# Patient Record
Sex: FEMALE | Race: WHITE | NOT HISPANIC OR LATINO | URBAN - METROPOLITAN AREA
[De-identification: names, ages, dates, MRNs, and addresses within clinical notes are randomized per-mention and may not be internally consistent; named-entity substitution may affect disease eponyms.]

---

## 2017-04-10 ENCOUNTER — APPOINTMENT (RX ONLY)
Dept: URBAN - METROPOLITAN AREA CLINIC 310 | Facility: CLINIC | Age: 67
Setting detail: DERMATOLOGY
End: 2017-04-10

## 2017-04-10 DIAGNOSIS — L90.0 LICHEN SCLEROSUS ET ATROPHICUS: ICD-10-CM

## 2017-04-10 DIAGNOSIS — B37.2 CANDIDIASIS OF SKIN AND NAIL: ICD-10-CM

## 2017-04-10 PROCEDURE — 99213 OFFICE O/P EST LOW 20 MIN: CPT

## 2017-04-10 PROCEDURE — ? COUNSELING

## 2017-04-10 PROCEDURE — ? OTHER

## 2017-04-10 NOTE — PROCEDURE: OTHER
Other (Free Text): Pt can use home supply of clotrimazole; pt has GYN appt tomorrow to test for vaginal candidiasis versus bacterial vaginosis; instructed pt she can d/c clotrimazole if GYN places her on diflucan
Detail Level: Detailed
Other (Free Text): Patient has home supply of clobetasol
Note Text (......Xxx Chief Complaint.): This diagnosis correlates with the

## 2017-09-20 ENCOUNTER — APPOINTMENT (RX ONLY)
Dept: URBAN - METROPOLITAN AREA CLINIC 310 | Facility: CLINIC | Age: 67
Setting detail: DERMATOLOGY
End: 2017-09-20

## 2017-09-20 DIAGNOSIS — D22 MELANOCYTIC NEVI: ICD-10-CM

## 2017-09-20 DIAGNOSIS — L73.9 FOLLICULAR DISORDER, UNSPECIFIED: ICD-10-CM

## 2017-09-20 DIAGNOSIS — L738 OTHER SPECIFIED DISEASES OF HAIR AND HAIR FOLLICLES: ICD-10-CM

## 2017-09-20 DIAGNOSIS — L30.4 ERYTHEMA INTERTRIGO: ICD-10-CM

## 2017-09-20 DIAGNOSIS — L663 OTHER SPECIFIED DISEASES OF HAIR AND HAIR FOLLICLES: ICD-10-CM

## 2017-09-20 PROBLEM — D22.5 MELANOCYTIC NEVI OF TRUNK: Status: ACTIVE | Noted: 2017-09-20

## 2017-09-20 PROBLEM — L02.422 FURUNCLE OF LEFT AXILLA: Status: ACTIVE | Noted: 2017-09-20

## 2017-09-20 PROBLEM — J30.1 ALLERGIC RHINITIS DUE TO POLLEN: Status: ACTIVE | Noted: 2017-09-20

## 2017-09-20 PROBLEM — L02.32 FURUNCLE OF BUTTOCK: Status: ACTIVE | Noted: 2017-09-20

## 2017-09-20 PROCEDURE — ? TREATMENT REGIMEN

## 2017-09-20 PROCEDURE — ? OTHER

## 2017-09-20 PROCEDURE — ? PRESCRIPTION

## 2017-09-20 PROCEDURE — 99213 OFFICE O/P EST LOW 20 MIN: CPT

## 2017-09-20 RX ORDER — CLOTRIMAZOLE 10 MG/G
CREAM TOPICAL
Qty: 1 | Refills: 3 | Status: ERX

## 2017-09-20 ASSESSMENT — LOCATION DETAILED DESCRIPTION DERM
LOCATION DETAILED: RIGHT ANTERIOR MEDIAL PROXIMAL THIGH
LOCATION DETAILED: LEFT ANTERIOR AXILLA
LOCATION DETAILED: LEFT ANTERIOR MEDIAL PROXIMAL THIGH
LOCATION DETAILED: LEFT MEDIAL BUTTOCK
LOCATION DETAILED: RIGHT MEDIAL UPPER BACK

## 2017-09-20 ASSESSMENT — LOCATION SIMPLE DESCRIPTION DERM
LOCATION SIMPLE: LEFT THIGH
LOCATION SIMPLE: LEFT BUTTOCK
LOCATION SIMPLE: RIGHT THIGH
LOCATION SIMPLE: RIGHT UPPER BACK
LOCATION SIMPLE: LEFT AXILLA

## 2017-09-20 ASSESSMENT — LOCATION ZONE DERM
LOCATION ZONE: TRUNK
LOCATION ZONE: LEG
LOCATION ZONE: AXILLAE

## 2017-09-20 NOTE — PROCEDURE: OTHER
Detail Level: Detailed
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 Scheduled for surgery in October
Note Text (......Xxx Chief Complaint.): This diagnosis correlates with the

## 2017-09-20 NOTE — PROCEDURE: TREATMENT REGIMEN
Notification: Please note that there are new Treatment Regimen plans which have an enhanced patient education handout experience.  The plans are called Treatment Regimen (Acne), Treatment Regimen (Atopic Dermatitis), Treatment Regimen (Rosacea), Treatment Regimen (Sun Protection), Treatment Regimen (Cosmetic Products), and Treatment Regimen (Xerosis).
Detail Level: Zone
Plan: shaving avoidance, air drying of areas. Antibiotics not helpful in past.  No culture. Make sure her surgeon doing the colon-vaginal repair of fistula knows she has had boils in buttock area

## 2017-11-02 ENCOUNTER — APPOINTMENT (RX ONLY)
Dept: URBAN - METROPOLITAN AREA CLINIC 310 | Facility: CLINIC | Age: 67
Setting detail: DERMATOLOGY
End: 2017-11-02

## 2017-11-02 DIAGNOSIS — L24 IRRITANT CONTACT DERMATITIS: ICD-10-CM

## 2017-11-02 PROBLEM — L24.9 IRRITANT CONTACT DERMATITIS, UNSPECIFIED CAUSE: Status: ACTIVE | Noted: 2017-11-02

## 2017-11-02 PROBLEM — L85.3 XEROSIS CUTIS: Status: ACTIVE | Noted: 2017-11-02

## 2017-11-02 PROCEDURE — ? PRESCRIPTION

## 2017-11-02 PROCEDURE — ? COUNSELING

## 2017-11-02 PROCEDURE — 99213 OFFICE O/P EST LOW 20 MIN: CPT

## 2017-11-02 RX ORDER — TRIAMCINOLONE ACETONIDE 1 MG/G
CREAM TOPICAL
Qty: 1 | Refills: 1 | Status: ERX | COMMUNITY
Start: 2017-11-02

## 2017-11-02 RX ADMIN — TRIAMCINOLONE ACETONIDE: 1 CREAM TOPICAL at 17:28

## 2017-11-02 ASSESSMENT — LOCATION ZONE DERM: LOCATION ZONE: TRUNK

## 2017-11-02 ASSESSMENT — LOCATION SIMPLE DESCRIPTION DERM: LOCATION SIMPLE: ABDOMEN

## 2017-11-02 ASSESSMENT — LOCATION DETAILED DESCRIPTION DERM
LOCATION DETAILED: RIGHT LATERAL ABDOMEN
LOCATION DETAILED: RIGHT RIB CAGE

## 2017-11-02 NOTE — HPI: RASH
How Severe Is Your Rash?: mild
Is This A New Presentation, Or A Follow-Up?: Rash
Additional History: Patient had surgery on October 11th and was instructed to shower with antibacterial provided by hospital, 2 hours later rash appeared. Has gotten a little better with the cortisone cream but still present

## 2017-11-29 ENCOUNTER — APPOINTMENT (RX ONLY)
Dept: URBAN - METROPOLITAN AREA CLINIC 310 | Facility: CLINIC | Age: 67
Setting detail: DERMATOLOGY
End: 2017-11-29

## 2017-11-29 ENCOUNTER — RX ONLY (OUTPATIENT)
Age: 67
Setting detail: RX ONLY
End: 2017-11-29

## 2017-11-29 DIAGNOSIS — L30.4 ERYTHEMA INTERTRIGO: ICD-10-CM

## 2017-11-29 PROBLEM — L85.3 XEROSIS CUTIS: Status: ACTIVE | Noted: 2017-11-29

## 2017-11-29 PROCEDURE — 99212 OFFICE O/P EST SF 10 MIN: CPT

## 2017-11-29 PROCEDURE — ? PRESCRIPTION

## 2017-11-29 PROCEDURE — ? TREATMENT REGIMEN

## 2017-11-29 PROCEDURE — ? OTHER

## 2017-11-29 RX ORDER — MICONAZOLE NITRATE 20.6 MG/G
POWDER TOPICAL
Qty: 1 | Refills: 0 | Status: ERX | COMMUNITY
Start: 2017-11-29

## 2017-11-29 RX ORDER — MICONAZOLE NITRATE 20.6 MG/G
POWDER TOPICAL
Qty: 3 | Refills: 3 | Status: ERX

## 2017-11-29 RX ADMIN — MICONAZOLE NITRATE: 20.6 POWDER TOPICAL at 17:28

## 2017-11-29 ASSESSMENT — LOCATION ZONE DERM
LOCATION ZONE: LEG
LOCATION ZONE: TRUNK

## 2017-11-29 ASSESSMENT — LOCATION DETAILED DESCRIPTION DERM
LOCATION DETAILED: RIGHT RIB CAGE
LOCATION DETAILED: RIGHT SUPRAPUBIC SKIN
LOCATION DETAILED: RIGHT ANTERIOR PROXIMAL THIGH

## 2017-11-29 ASSESSMENT — LOCATION SIMPLE DESCRIPTION DERM
LOCATION SIMPLE: ABDOMEN
LOCATION SIMPLE: GROIN
LOCATION SIMPLE: RIGHT THIGH

## 2017-11-29 NOTE — PROCEDURE: OTHER
Note Text (......Xxx Chief Complaint.): This diagnosis correlates with the
Detail Level: Detailed
Other (Free Text): This is a separate problem from the irritant contact dermatitis she presented with in early November

## 2017-12-01 ENCOUNTER — RX ONLY (OUTPATIENT)
Age: 67
Setting detail: RX ONLY
End: 2017-12-01

## 2017-12-01 RX ORDER — MICONAZOLE NITRATE 20.6 MG/G
POWDER TOPICAL
Qty: 3 | Refills: 3 | Status: ERX